# Patient Record
Sex: MALE | Race: WHITE | NOT HISPANIC OR LATINO | URBAN - NONMETROPOLITAN AREA
[De-identification: names, ages, dates, MRNs, and addresses within clinical notes are randomized per-mention and may not be internally consistent; named-entity substitution may affect disease eponyms.]

---

## 2020-08-07 PROCEDURE — U0002 COVID-19 LAB TEST NON-CDC: HCPCS | Performed by: NURSE PRACTITIONER

## 2020-08-07 PROCEDURE — U0004 COV-19 TEST NON-CDC HGH THRU: HCPCS | Performed by: NURSE PRACTITIONER

## 2021-12-23 PROCEDURE — U0004 COV-19 TEST NON-CDC HGH THRU: HCPCS | Performed by: NURSE PRACTITIONER

## 2022-09-21 ENCOUNTER — APPOINTMENT (OUTPATIENT)
Dept: GENERAL RADIOLOGY | Facility: HOSPITAL | Age: 27
End: 2022-09-21

## 2022-09-21 ENCOUNTER — HOSPITAL ENCOUNTER (EMERGENCY)
Facility: HOSPITAL | Age: 27
Discharge: HOME | End: 2022-09-21
Attending: EMERGENCY MEDICINE | Admitting: EMERGENCY MEDICINE

## 2022-09-21 VITALS
OXYGEN SATURATION: 100 % | RESPIRATION RATE: 18 BRPM | HEART RATE: 64 BPM | BODY MASS INDEX: 28.23 KG/M2 | DIASTOLIC BLOOD PRESSURE: 85 MMHG | SYSTOLIC BLOOD PRESSURE: 130 MMHG | HEIGHT: 74 IN | WEIGHT: 220 LBS | TEMPERATURE: 98.1 F

## 2022-09-21 DIAGNOSIS — M79.672 LEFT FOOT PAIN: Primary | ICD-10-CM

## 2022-09-21 PROCEDURE — 99283 EMERGENCY DEPT VISIT LOW MDM: CPT

## 2022-09-21 PROCEDURE — 73630 X-RAY EXAM OF FOOT: CPT

## 2022-09-21 RX ORDER — KETOROLAC TROMETHAMINE 10 MG/1
10 TABLET, FILM COATED ORAL EVERY 6 HOURS PRN
Qty: 20 TABLET | Refills: 0 | Status: SHIPPED | OUTPATIENT
Start: 2022-09-21

## 2022-09-21 RX ORDER — KETOROLAC TROMETHAMINE 10 MG/1
10 TABLET, FILM COATED ORAL EVERY 6 HOURS PRN
Status: DISCONTINUED | OUTPATIENT
Start: 2022-09-21 | End: 2022-09-21 | Stop reason: HOSPADM

## 2022-09-21 RX ADMIN — KETOROLAC TROMETHAMINE 10 MG: 10 TABLET, FILM COATED ORAL at 21:43

## 2022-09-22 NOTE — DISCHARGE INSTRUCTIONS
Wear the boot while standing or walking to help with bearing weight.  Can use the crutches for the first 1 to 2 days until you are able to tolerate bearing weight.  While at home keep your foot elevated, can use ice intermittently to help with pain.  Take the prescribed medication as directed for pain.  If you are not noting any improvement in the next few days you can follow-up with Dr. Garcia for reevaluation.
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
## 2023-09-15 ENCOUNTER — LAB (OUTPATIENT)
Dept: LAB | Facility: HOSPITAL | Age: 28
End: 2023-09-15
Payer: COMMERCIAL

## 2023-09-15 ENCOUNTER — OFFICE VISIT (OUTPATIENT)
Dept: INTERNAL MEDICINE | Facility: CLINIC | Age: 28
End: 2023-09-15
Payer: COMMERCIAL

## 2023-09-15 VITALS
BODY MASS INDEX: 30.62 KG/M2 | WEIGHT: 238.6 LBS | TEMPERATURE: 98.4 F | HEIGHT: 74 IN | SYSTOLIC BLOOD PRESSURE: 118 MMHG | DIASTOLIC BLOOD PRESSURE: 80 MMHG | HEART RATE: 68 BPM

## 2023-09-15 DIAGNOSIS — Z11.59 NEED FOR HEPATITIS C SCREENING TEST: ICD-10-CM

## 2023-09-15 DIAGNOSIS — N50.811 TESTICULAR PAIN, RIGHT: Primary | ICD-10-CM

## 2023-09-15 DIAGNOSIS — N50.811 TESTICULAR PAIN, RIGHT: ICD-10-CM

## 2023-09-15 DIAGNOSIS — Z11.3 ROUTINE SCREENING FOR STI (SEXUALLY TRANSMITTED INFECTION): ICD-10-CM

## 2023-09-15 DIAGNOSIS — Z23 NEED FOR IMMUNIZATION AGAINST INFLUENZA: ICD-10-CM

## 2023-09-15 LAB
ALBUMIN SERPL-MCNC: 4.6 G/DL (ref 3.5–5.2)
ALBUMIN/GLOB SERPL: 1.4 G/DL
ALP SERPL-CCNC: 58 U/L (ref 39–117)
ALT SERPL W P-5'-P-CCNC: 19 U/L (ref 1–41)
ANION GAP SERPL CALCULATED.3IONS-SCNC: 12 MMOL/L (ref 5–15)
AST SERPL-CCNC: 27 U/L (ref 1–40)
BILIRUB SERPL-MCNC: 0.5 MG/DL (ref 0–1.2)
BUN SERPL-MCNC: 18 MG/DL (ref 6–20)
BUN/CREAT SERPL: 16.8 (ref 7–25)
CALCIUM SPEC-SCNC: 10 MG/DL (ref 8.6–10.5)
CHLORIDE SERPL-SCNC: 102 MMOL/L (ref 98–107)
CO2 SERPL-SCNC: 26 MMOL/L (ref 22–29)
CREAT SERPL-MCNC: 1.07 MG/DL (ref 0.76–1.27)
DEPRECATED RDW RBC AUTO: 38.2 FL (ref 37–54)
EGFRCR SERPLBLD CKD-EPI 2021: 98.2 ML/MIN/1.73
ERYTHROCYTE [DISTWIDTH] IN BLOOD BY AUTOMATED COUNT: 11.4 % (ref 12.3–15.4)
GLOBULIN UR ELPH-MCNC: 3.2 GM/DL
GLUCOSE SERPL-MCNC: 85 MG/DL (ref 65–99)
HCT VFR BLD AUTO: 46.2 % (ref 37.5–51)
HCV AB SER DONR QL: NORMAL
HGB BLD-MCNC: 16.1 G/DL (ref 13–17.7)
MCH RBC QN AUTO: 32.3 PG (ref 26.6–33)
MCHC RBC AUTO-ENTMCNC: 34.8 G/DL (ref 31.5–35.7)
MCV RBC AUTO: 92.6 FL (ref 79–97)
PLATELET # BLD AUTO: 300 10*3/MM3 (ref 140–450)
PMV BLD AUTO: 9.7 FL (ref 6–12)
POTASSIUM SERPL-SCNC: 4.2 MMOL/L (ref 3.5–5.2)
PROT SERPL-MCNC: 7.8 G/DL (ref 6–8.5)
RBC # BLD AUTO: 4.99 10*6/MM3 (ref 4.14–5.8)
SODIUM SERPL-SCNC: 140 MMOL/L (ref 136–145)
WBC NRBC COR # BLD: 6.12 10*3/MM3 (ref 3.4–10.8)

## 2023-09-15 PROCEDURE — 86803 HEPATITIS C AB TEST: CPT

## 2023-09-15 PROCEDURE — 87491 CHLMYD TRACH DNA AMP PROBE: CPT

## 2023-09-15 PROCEDURE — 85027 COMPLETE CBC AUTOMATED: CPT

## 2023-09-15 PROCEDURE — 87591 N.GONORRHOEAE DNA AMP PROB: CPT

## 2023-09-15 PROCEDURE — 80053 COMPREHEN METABOLIC PANEL: CPT

## 2023-09-15 NOTE — PROGRESS NOTES
"Chief Complaint  Nakul Giang is a 28 y.o. male presenting for Establish Care and Testicle Pain.     From [place]. , no children. 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    Patient has no significant past medical history.    History of Present Illness  Patient is here to establish care.    He is overall in good health, no history of allergies, no history of asthma or lung disease, no history of heart, liver or kidney disease.    He reports right testicular pain for 2-3 weeks, no injury or activity to trigger this.  He reports pain level 2-5/10.  He has not noticed any swelling.  No fever or chills.  Of note he had strep throat about 2 weeks ago that was treated with unknown antibiotic.  No penile discharge, no frequent or painful urination.  No loose bowels or diarrhea, no abdominal pain, no black or bloody stools.  Patient never tested for STI, he is monogamous with his wife.  He has not taken anything for pain.    Patient reports doing blood work at the [employer] just about a month ago, and nothing concerning.  They also screen for HIV.    The following portions of the patient's history were reviewed and updated as appropriate: allergies, current medications, past family history, past medical history, past social history, past surgical history, and problem list.    Objective  /80 (BP Location: Left arm, Patient Position: Sitting, Cuff Size: Large Adult)   Pulse 68   Temp 98.4 °F (36.9 °C)   Ht 189 cm (74.41\")   Wt 108 kg (238 lb 9.6 oz)   BMI 30.30 kg/m²     Physical Exam  Vitals reviewed.   Constitutional:       Appearance: Normal appearance.   HENT:      Head: Normocephalic and atraumatic.      Nose: Nose normal. No congestion.      Mouth/Throat:      Mouth: Mucous membranes are moist.   Eyes:      Extraocular Movements: Extraocular movements intact.      Conjunctiva/sclera: Conjunctivae normal.   Cardiovascular:      Rate and Rhythm: Normal rate and " regular rhythm.      Heart sounds: Normal heart sounds. No murmur heard.  Pulmonary:      Effort: Pulmonary effort is normal.      Breath sounds: Normal breath sounds.   Abdominal:      General: There is no distension.      Palpations: Abdomen is soft. There is no mass.      Tenderness: There is no abdominal tenderness.   Genitourinary:     Comments: Scrotum: Nontender and normal left testicle.  Right testicle normal size, diffusely tender, also some tenderness behind the testicle.  Not able to palpate any significant swelling, no masses.  Musculoskeletal:      Cervical back: Neck supple.      Right lower leg: No edema.      Left lower leg: No edema.   Skin:     General: Skin is warm and dry.   Neurological:      Mental Status: He is alert and oriented to person, place, and time. Mental status is at baseline.   Psychiatric:         Behavior: Behavior normal.         Thought Content: Thought content normal.       Assessment/Plan   1. Testicular pain, right  New problem with unknown etiology and prognosis.  We will do ultrasound to rule out neoplasm/cancer.  Could be mild epididymitis.  Recommend follow-up over-the-counter ibuprofen to see if it may alleviate.  Avoid any activities that can cause trauma to the testicle.  Recommend screening for gonorrhea and chlamydia.  We will do follow-up in about 2 months with annual physical, sooner if any worsening symptoms.  - Comprehensive Metabolic Panel; Future  - CBC (No Diff); Future  - US Scrotum & Testicles; Future    2. Need for immunization against influenza  Administered today  - Fluzone (or Fluarix & Flulaval for VFC) >6mos    3. Routine screening for STI (sexually transmitted infection)  - Chlamydia trachomatis, Neisseria gonorrhoeae, PCR - Swab, Urine, Random Void; Future    4. Need for hepatitis C screening test  - Hepatitis C Antibody; Future      Return in about 2 months (around 11/15/2023), or if symptoms worsen or fail to improve, for Annual physical.    Future  Appointments         Provider Department [place]    11/20/2023 1:15 PM Fabian Lee MD [facility] INTERNAL MEDICINE [place]              Fabian Lee MD  Family Medicine  09/15/2023

## 2023-09-18 LAB
C TRACH RRNA SPEC QL NAA+PROBE: NEGATIVE
N GONORRHOEA RRNA SPEC QL NAA+PROBE: NEGATIVE

## 2023-11-03 ENCOUNTER — HOSPITAL ENCOUNTER (OUTPATIENT)
Dept: ULTRASOUND IMAGING | Facility: HOSPITAL | Age: 28
Discharge: HOME | End: 2023-11-03
Admitting: STUDENT IN AN ORGANIZED HEALTH CARE EDUCATION/TRAINING PROGRAM
Payer: COMMERCIAL

## 2023-11-03 DIAGNOSIS — N50.811 TESTICULAR PAIN, RIGHT: ICD-10-CM

## 2023-11-03 PROCEDURE — 76870 US EXAM SCROTUM: CPT

## 2023-11-20 ENCOUNTER — OFFICE VISIT (OUTPATIENT)
Dept: INTERNAL MEDICINE | Facility: CLINIC | Age: 28
End: 2023-11-20
Payer: COMMERCIAL

## 2023-11-20 VITALS
DIASTOLIC BLOOD PRESSURE: 64 MMHG | SYSTOLIC BLOOD PRESSURE: 102 MMHG | HEIGHT: 74 IN | BODY MASS INDEX: 31.13 KG/M2 | WEIGHT: 242.6 LBS | TEMPERATURE: 97.8 F | HEART RATE: 60 BPM

## 2023-11-20 DIAGNOSIS — N50.811 TESTICULAR PAIN, RIGHT: ICD-10-CM

## 2023-11-20 DIAGNOSIS — Z00.00 WELL ADULT EXAM: Primary | ICD-10-CM

## 2023-11-20 PROCEDURE — 99395 PREV VISIT EST AGE 18-39: CPT | Performed by: STUDENT IN AN ORGANIZED HEALTH CARE EDUCATION/TRAINING PROGRAM

## 2023-11-20 NOTE — PROGRESS NOTES
"Chief Complaint  Nakul Giang is a 28 y.o. male presenting for Annual Exam.     From [place]. , no children. 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     Patient has no significant past medical history.    History of Present Illness  Patient is here for annual physical and follow-up.    He did ultrasound of his scrotum and testicles that showed small complex bilateral hydroceles, and small bilateral varicoceles.  Patient is still experiencing pain and discomfort of the right testicle, which is typically worse after exercise, no pain laded to sexual intercourse.  Of note he tells me his wife is pregnant around 8-9 weeks with her first child.    Patient is otherwise doing well, he has no concerns for today.  He is very physically active, he goes to the gym 5 days a week, exercises about 1.5 hours every day.  He maintains stable weight around 235-245 pounds, has minimal amounts of body fat.    The following portions of the patient's history were reviewed and updated as appropriate: allergies, current medications, past family history, past medical history, past social history, past surgical history, and problem list.    Objective  /64 (BP Location: Left arm, Patient Position: Sitting, Cuff Size: Large Adult)   Pulse 60   Temp 97.8 °F (36.6 °C) (Temporal)   Ht 189 cm (74.41\")   Wt 110 kg (242 lb 9.6 oz)   BMI 30.81 kg/m²     Physical Exam  Constitutional:       Appearance: Normal appearance.      Comments: Athletic/muscular body habitus, minimal amounts of body fat   HENT:      Head: Normocephalic and atraumatic.   Eyes:      Extraocular Movements: Extraocular movements intact.      Conjunctiva/sclera: Conjunctivae normal.   Cardiovascular:      Rate and Rhythm: Normal rate and regular rhythm.      Heart sounds: Normal heart sounds.   Pulmonary:      Effort: Pulmonary effort is normal. No respiratory distress.      Breath sounds: Normal breath sounds. "   Musculoskeletal:      Cervical back: Neck supple.   Neurological:      Mental Status: He is alert and oriented to person, place, and time. Mental status is at baseline.   Psychiatric:         Behavior: Behavior normal.         Thought Content: Thought content normal.         Assessment/Plan   1. Well adult exam  Counseled on recommendations for COVID booster.  Patient had a reaction to the Moderna COVID booster received in February 2022.  I suggest Pfizer vaccine as possible other option.  Patient is up-to-date on flu vaccine and tetanus vaccine.  Counseled on recommendations for regular dental visits.  Patient typically goes 2-3 times yearly.    2. Testicular pain, right  Blood work and ultrasound done.  Patient continues to have pain and discomfort.  Will refer to urology for evaluation.  - Ambulatory Referral to Urology    3. BMI 30.0-30.9,adult  BMI is >= 30 and <35. (Class 1 Obesity). The following options were offered after discussion;: exercise counseling/recommendations  This Smartset classifies patient as obese.  He has a very athletic body build, and clinically I do not consider him to be overweight or obese.      Return in about 1 year (around 11/20/2024) for Annual physical.    Future Appointments         Provider Department [facility]    11/22/2024 1:15 PM Fabian Lee MD [facility] INTERNAL MEDICINE [place]              Fabian Lee MD  Family Medicine  11/20/2023

## 2024-01-24 ENCOUNTER — OFFICE VISIT (OUTPATIENT)
Dept: UROLOGY | Facility: CLINIC | Age: 29
End: 2024-01-24
Payer: COMMERCIAL

## 2024-01-24 ENCOUNTER — PATIENT ROUNDING (BHMG ONLY) (OUTPATIENT)
Dept: UROLOGY | Facility: CLINIC | Age: 29
End: 2024-01-24
Payer: COMMERCIAL

## 2024-01-24 VITALS
BODY MASS INDEX: 31.13 KG/M2 | DIASTOLIC BLOOD PRESSURE: 82 MMHG | WEIGHT: 242.6 LBS | SYSTOLIC BLOOD PRESSURE: 126 MMHG | HEIGHT: 74 IN

## 2024-01-24 DIAGNOSIS — N50.819 PAIN IN TESTICLE, UNSPECIFIED LATERALITY: Primary | ICD-10-CM

## 2024-01-24 LAB
BILIRUB BLD-MCNC: NEGATIVE MG/DL
CLARITY, POC: CLEAR
COLOR UR: YELLOW
EXPIRATION DATE: NORMAL
GLUCOSE UR STRIP-MCNC: NEGATIVE MG/DL
KETONES UR QL: NEGATIVE
LEUKOCYTE EST, POC: NEGATIVE
Lab: NORMAL
NITRITE UR-MCNC: NEGATIVE MG/ML
PH UR: 7.5 [PH] (ref 5–8)
PROT UR STRIP-MCNC: NEGATIVE MG/DL
RBC # UR STRIP: NEGATIVE /UL
SP GR UR: 1.01 (ref 1–1.03)
UROBILINOGEN UR QL: NORMAL

## 2024-01-24 PROCEDURE — 81003 URINALYSIS AUTO W/O SCOPE: CPT | Performed by: NURSE PRACTITIONER

## 2024-01-24 PROCEDURE — 99213 OFFICE O/P EST LOW 20 MIN: CPT | Performed by: NURSE PRACTITIONER

## 2024-01-24 NOTE — PROGRESS NOTES
"       Office Visit Testicular Pain      Patient Name: Nakul Giang  : 1995   MRN: [id number]     Chief Complaint: testicular pain.   Chief Complaint   Patient presents with    Testicle Pain     New patient       Referring Provider: Fabian Lee MD    History of Present Illness: Mr. Nakul Giang is a 28 y.o. male who presents with intermittent and chronic right testicular pain x 6 months.     Quality:    Dull pressure when he is squatting and lifting weights  Provocative factors:  Activity makes it worse  Palliative factors:   Rest does makes it better  Radiation:   Into the pelvis  Severity:   2/10 currently and 5/10 at its worst  Previous treatments: no    No  Current LUTS  No History of vasectomy  No History of kidney stones, recent trauma or injury.  No  History of constipation      Subjective      Review of System:   As noted in HPI     Past Medical History: History reviewed. No pertinent past medical history.    Past Surgical History:   Past Surgical History:   Procedure Laterality Date    EYE SURGERY  2021    PRK    WISDOM TOOTH EXTRACTION         Family History:   Family History   Problem Relation Age of Onset    Cancer Father         Brain Tumor       Social History:   Social History     Socioeconomic History    Marital status: Single   Tobacco Use    Smoking status: Never    Smokeless tobacco: Never   Vaping Use    Vaping Use: Never used   Substance and Sexual Activity    Alcohol use: Not Currently    Drug use: Never    Sexual activity: Yes     Partners: Female     Birth control/protection: Condom, Pill       Medications:   No current outpatient medications on file.    Allergies:   No Known Allergies    Objective     Physical Exam:   Vital Signs:   Vitals:    24 1401   BP: 126/82   BP Location: Left arm   Patient Position: Sitting   Cuff Size: Adult   Weight: 110 kg (242 lb 9.6 oz)   Height: 189 cm (74.41\")     Body mass index is 30.81 kg/m².     Constitutional: NAD, WDWN. "   Neurological: A + O x 3   Psychiatric:  Normal mood and affect    Genitourinary  Penis: circumcised penis, glans normal, no penile discharge.  No rashes/lesions.    Testes: descended bilaterally, no masses, right side tenderness to palpation. Remainder of scrotal contents normal. No hernia appreciated, does have right groin tenderness with palpation   Perineum:  No perineal pain with palpation.      Labs  Brief Urine Lab Results  (Last result in the past 365 days)        Color   Clarity   Blood   Leuk Est   Nitrite   Protein   CREAT   Urine HCG        01/24/24 1407 Yellow   Clear   Negative   Negative   Negative   Negative                   Lab Results   Component Value Date    GLUCOSE 85 09/15/2023    CALCIUM 10.0 09/15/2023     09/15/2023    K 4.2 09/15/2023    CO2 26.0 09/15/2023     09/15/2023    BUN 18 09/15/2023    CREATININE 1.07 09/15/2023    BCR 16.8 09/15/2023    ANIONGAP 12.0 09/15/2023       Radiologic Studies  US Scrotum & Testicles    Result Date: 11/3/2023  1. No evidence of testicular mass or torsion. 2. Small complex bilateral hydroceles. 3. Small bilateral varicoceles.   This report was signed and finalized on 11/3/2023 4:09 PM by Davis Cheung MD.        Assessment / Plan      Assessment  Mr. Giang is a 28 y.o. male who presents with chronic left scrotal pain.    We discussed different strategies for management including conservative therapy, pain medications (gabapentin, nortriptyline) and surgical interventions (cord denervation).    Plan  1.  Scrotal support continuously, especially with increased activity.  2.  Warm bath soaks twice daily.  3.  NSAIDs for pain as instructed with food.  4. PFPT ordered    Follow Up:   Return in about 3 months (around 4/24/2024) for Follow up KACEY Clay, NP-C  [facility] [place]

## 2024-01-26 NOTE — PROGRESS NOTES
January 26, 2024    Hello, may I speak with Nakul Giang? Unable to reach pt.    My name is Lela.     I am  with [facility]  [address]  [facility] [place] [address] [phone].    Before we get started may I verify your date of birth? 1995    I am calling to officially welcome you to our practice and ask about your recent visit. Is this a good time to talk?     Tell me about your visit with us. What things went well?         We're always looking for ways to make our patients' experiences even better. Do you have recommendations on ways we may improve?      Overall were you satisfied with your first visit to our practice?        I appreciate you taking the time to speak with me today. Is there anything else I can do for you?       Thank you, and have a great day.

## 2024-01-26 NOTE — PROGRESS NOTES
January 26, 2024    Hello, may I speak with Nakul Giang? Unable to reach patient.    My name is Lela      I am  with [facility]  [address]  [address]  [facility] [address] [phone].    Before we get started may I verify your date of birth? 1995    I am calling to officially welcome you to our practice and ask about your recent visit. Is this a good time to talk?     Tell me about your visit with us. What things went well?         We're always looking for ways to make our patients' experiences even better. Do you have recommendations on ways we may improve?      Overall were you satisfied with your first visit to our practice?        I appreciate you taking the time to speak with me today. Is there anything else I can do for you?       Thank you, and have a great day.

## 2024-03-13 ENCOUNTER — OFFICE VISIT (OUTPATIENT)
Dept: UROLOGY | Facility: CLINIC | Age: 29
End: 2024-03-13
Payer: COMMERCIAL

## 2024-03-13 VITALS
SYSTOLIC BLOOD PRESSURE: 118 MMHG | WEIGHT: 242 LBS | HEIGHT: 74 IN | BODY MASS INDEX: 31.06 KG/M2 | DIASTOLIC BLOOD PRESSURE: 76 MMHG | HEART RATE: 84 BPM | OXYGEN SATURATION: 97 %

## 2024-03-13 DIAGNOSIS — N45.1 EPIDIDYMITIS: Primary | ICD-10-CM

## 2024-03-13 RX ORDER — LEVOFLOXACIN 500 MG/1
500 TABLET, FILM COATED ORAL DAILY
Qty: 10 TABLET | Refills: 0 | Status: SHIPPED | OUTPATIENT
Start: 2024-03-13 | End: 2024-03-23

## 2024-03-13 NOTE — PROGRESS NOTES
"       Office Visit Established Male Patient     Patient Name: Nakul Giang  : 1995   MRN: [id number]     Chief Complaint:   Chief Complaint   Patient presents with    Testicle Pain     3mo follow up for testicle pain.       History of Present Illness: Mr. Nakul Giang is a 29 y.o. male who presents today for follow up with testicular pain.  His pain increased and he has not started his PFPT returned because he wanted to be seen where he will be doing a  training in July   No concerms for sti has tested negative      Subjective      Review of System:   As noted in HPI    Past Medical History: History reviewed. No pertinent past medical history.    Past Surgical History:   Past Surgical History:   Procedure Laterality Date    EYE SURGERY  2021    PRK    WISDOM TOOTH EXTRACTION         Family History:   Family History   Problem Relation Age of Onset    Cancer Father         Brain Tumor       Social History:   Social History     Socioeconomic History    Marital status: Single   Tobacco Use    Smoking status: Never     Passive exposure: Never    Smokeless tobacco: Never   Vaping Use    Vaping status: Never Used   Substance and Sexual Activity    Alcohol use: Not Currently    Drug use: Never    Sexual activity: Yes     Partners: Female     Birth control/protection: None       Medications:     Current Outpatient Medications:     levoFLOXacin (Levaquin) 500 MG tablet, Take 1 tablet by mouth Daily for 10 days., Disp: 10 tablet, Rfl: 0    Allergies:   No Known Allergies    Objective     Physical Exam:   Vital Signs:   Vitals:    24 1358   BP: 118/76   Pulse: 84   SpO2: 97%   Weight: 110 kg (242 lb)   Height: 188 cm (74\")   PainSc:   2   PainLoc: Comment: right testicle     Body mass index is 31.07 kg/m².     Physical Exam  Constitutional: NAD, WDWN.   Neurological: A + O x 3.   Psychiatric:  Normal mood and affect    Genitourinary  Penis: circumcised penis, glans normal, no penile discharge. "  No rashes/lesions.    Testes: bilateral normal testicles, no masses and normal consistency, right epididymis tender to palpation. Vasa palpable bilaterally.  No clinically palpable varicocele with valsalva. Remainder of scrotal contents normal  Perineum:  No perineal pain with palpation      Labs  Brief Urine Lab Results  (Last result in the past 365 days)        Color   Clarity   Blood   Leuk Est   Nitrite   Protein   CREAT   Urine HCG        01/24/24 1407 Yellow   Clear   Negative   Negative   Negative   Negative                   Lab Results   Component Value Date    GLUCOSE 85 09/15/2023    CALCIUM 10.0 09/15/2023     09/15/2023    K 4.2 09/15/2023    CO2 26.0 09/15/2023     09/15/2023    BUN 18 09/15/2023    CREATININE 1.07 09/15/2023    BCR 16.8 09/15/2023    ANIONGAP 12.0 09/15/2023       Lab Results   Component Value Date    WBC 6.12 09/15/2023    HGB 16.1 09/15/2023    HCT 46.2 09/15/2023    MCV 92.6 09/15/2023     09/15/2023            Radiographic Studies  No Images in the past 120 days found..    I have reviewed the above labs and imaging.     Assessment / Plan      Assessment/Plan:   Diagnoses and all orders for this visit:    1. Epididymitis (Primary)  -     levoFLOXacin (Levaquin) 500 MG tablet; Take 1 tablet by mouth Daily for 10 days.  Dispense: 10 tablet; Refill: 0     29-year-old male here with a history of testicular pain, scrotal ultrasound in November showed small bilateral complex hydroceles and bilateral small varicoceles.  His tenderness persists on the right side on exam will treat with Levaquin 500 mg p.o. x 10 days for epididymitis has no concerns for STIs.  Recommend patient continue with plan for PFPT, and will consider cord block if no improvements after antibiotics.    Start Levaquin 500 mg x 10 days  Continue PFPT  Recommend scrotal support with physical activity  NSAIDs with food as needed for pain  Warm baths as needed for pain  Reevaluate and consider cord  block if no improvement    Follow Up:   Return in about 4 weeks (around 4/10/2024) for Follow up KACEY Clay,NP-C  [facility] [place]